# Patient Record
(demographics unavailable — no encounter records)

---

## 2024-11-12 NOTE — ASSESSMENT
[Verbal] : Verbal [Written] : Written [Demo] : Demo [Patient] : Patient [Home Health Provider] : Home Health Provider [Good - alert, interested, motivated] : Good - alert, interested, motivated [Verbalizes knowledge/Understanding] : Verbalizes knowledge/understanding [Dressing changes] : dressing changes [Skin Care] : skin care [Signs and symptoms of infection] : sign and symptoms of infection [How and When to Call] : how and when to call [Pain Management] : pain management [Patient responsibility to plan of care] : patient responsibility to plan of care [] : Yes [FreeTextEntry2] : Infection prevention Localized wound care  Goal remaining pain free regarding wounds [FreeTextEntry4] : Follow up in 2 weeks  [Stable] : stable [Home] : Home [Ambulatory] : Ambulatory [Faxed - Long Term Care/Home Health Agency] : Long Term Care/Home Health Agency: Faxed [FreeTextEntry1] : [facility]

## 2024-11-12 NOTE — PLAN
[FreeTextEntry1] : adaptic,  Ca alginate, tegaderm DD, to scalp 3 X/week F/u-2 wks  time spent-25 mins.

## 2024-11-12 NOTE — PHYSICAL EXAM
[Normal Thyroid] : the thyroid was normal [Normal Breath Sounds] : Normal breath sounds [Normal Heart Sounds] : normal heart sounds [Normal Rate and Rhythm] : normal rate and rhythm [Alert] : alert [Oriented to Person] : oriented to person [Oriented to Place] : oriented to place [Oriented to Time] : oriented to time [Calm] : calm [JVD] : no jugular venous distention  [Abdomen Masses] : No abdominal massess [Abdomen Tenderness] : ~T ~M No abdominal tenderness [Tender] : nontender [Enlarged] : not enlarged [de-identified] : adult WM, NAD, alert ,Ox3. [2 x 2] : 2 x 2  [FreeTextEntry1] : Right parietal scalp  [FreeTextEntry2] : 0.4 [FreeTextEntry3] : 1.0 [FreeTextEntry4] : 0.1 [de-identified] : Serous/sanguinous [de-identified] : calcium alginate  [de-identified] : Mechanically cleansed with sterile gauze and normal saline. Tegaderm  [TWNoteComboBox4] : Small [TWNoteComboBox6] : Other [de-identified] : Normal [de-identified] : None [de-identified] : None [de-identified] : 100% [de-identified] : No [de-identified] : 3x Weekly [de-identified] : Primary Dressing

## 2024-11-12 NOTE — REVIEW OF SYSTEMS
[Negative] : Heme/Lymph [FreeTextEntry2] : overweight,  [FreeTextEntry7] : Gastritis [FreeTextEntry9] : Hx liposarcoma shoulder , Bilateral CTS [de-identified] : multiple BCC scalp

## 2024-11-12 NOTE — HISTORY OF PRESENT ILLNESS
[FreeTextEntry1] : 67 yo WM, here for f/u of scalp wounds. Pt is s/p Mohs for BCC Right Central Frontal Scalp Posterior and BCC Right Inferior Frontal Scalp in 5/23. Compared to pt's cellphone photo from 5/23, marked improvement seen since then. Mod drainage with some yellow slough still seen. No SOI. 5/14/24 right parietal scalp wound smaller with new granulation and less slough. No SOI 5/21/24 right parietal scalp wound smaller with NO SOI. anterior scalp wound has been rebiopsied by dermatology by patient hX with no disease noted. DPD. Wound right side cultured and is significantly smaller 5/28/24 right parietal scalp wound almost closed and very clean. No SOI 6/4/24 right lateral scalp wound clean but bled through bandage last night. Small clean open wound. Very thin epithelium. No SOI   discussed culture report Serratia sensitive to gentamicin. but no purulent drainage noted. will observe  6/18/24 recently hospitalized for COPD. Small laceration to right thumb yesterday that frequently bleeds because he is on a thinner. The right lateral scalp would is larger and more drainage. No SOI. Will start topical Gentamicin because of prior culture and increased drainage 6/21/24 scalp wound larger with increased drainage. There are no signs of infection. Patient states he sleeps with head elevated and lays on his left side. Wound recultured. Will continue present treatment 6/25/24 Culture grew Serratia still sensative to gentamicin but no signs of infection. Still has serosanguinous drainage. Referred to Plastic surgery as area will not reepithelialize  7/2/24 less drainage right scalp wound and wound is smaller. No SOI 7/9/24 less drainage right parietal scalp and wound smaller. No SOI 7/30/24 right parietal scalp almost closed. No SOI. New problem left hand near index MCP small mass. no SOI 8/13/24 scalp wound almost closed. One small open spot. No SOI 8/27/24 right scalp wound has increased in size. Patient had 2 falls since last here but denies trauma. Was almost closed last visit. No SOI 9/24/24 defect right parietal scalp slightly smaller. No SOI. Patient was hospitalized since last visit and seen by Dermatology who discussed a possible re biopsy of area. No SOI 11/12/24 right parietal scalp defect almost closed. No SOI

## 2024-12-17 NOTE — HISTORY OF PRESENT ILLNESS
[FreeTextEntry1] : 65 yo WM, here for f/u of scalp wounds. Pt is s/p Mohs for BCC Right Central Frontal Scalp Posterior and BCC Right Inferior Frontal Scalp in 5/23. Compared to pt's cellphone photo from 5/23, marked improvement seen since then. Mod drainage with some yellow slough still seen. No SOI. 5/14/24 right parietal scalp wound smaller with new granulation and less slough. No SOI 5/21/24 right parietal scalp wound smaller with NO SOI. anterior scalp wound has been rebiopsied by dermatology by patient hX with no disease noted. DPD. Wound right side cultured and is significantly smaller 5/28/24 right parietal scalp wound almost closed and very clean. No SOI 6/4/24 right lateral scalp wound clean but bled through bandage last night. Small clean open wound. Very thin epithelium. No SOI   discussed culture report Serratia sensitive to gentamicin. but no purulent drainage noted. will observe  6/18/24 recently hospitalized for COPD. Small laceration to right thumb yesterday that frequently bleeds because he is on a thinner. The right lateral scalp would is larger and more drainage. No SOI. Will start topical Gentamicin because of prior culture and increased drainage 6/21/24 scalp wound larger with increased drainage. There are no signs of infection. Patient states he sleeps with head elevated and lays on his left side. Wound recultured. Will continue present treatment 6/25/24 Culture grew Serratia still sensative to gentamicin but no signs of infection. Still has serosanguinous drainage. Referred to Plastic surgery as area will not reepithelialize  7/2/24 less drainage right scalp wound and wound is smaller. No SOI 7/9/24 less drainage right parietal scalp and wound smaller. No SOI 7/30/24 right parietal scalp almost closed. No SOI. New problem left hand near index MCP small mass. no SOI 8/13/24 scalp wound almost closed. One small open spot. No SOI 8/27/24 right scalp wound has increased in size. Patient had 2 falls since last here but denies trauma. Was almost closed last visit. No SOI 9/24/24 defect right parietal scalp slightly smaller. No SOI. Patient was hospitalized since last visit and seen by Dermatology who discussed a possible re biopsy of area. No SOI 11/12/24 right parietal scalp defect almost closed. No SOI 12/17/24 right parietal scalp wound has 2 small open areas. Patient has not covered wound and area is proably opened from minor trauma. No SOI. Patient was recently hospitalized for respiratory problems

## 2024-12-17 NOTE — VITALS
Ortho    Looks  Good    A bit  More  drAINAGE  OUT OF THE  BLKE  TUBE  WILL LEAVE IN  TILL TOMORROW     D/C PLANNED  Sunday    LEAVE DRESSING  OVER  SKIN GRAFT  FOR  NOW    Long term  Ancef  Unless cx  change   [] : Yes [de-identified] : Patient has no pain related to wound.

## 2024-12-17 NOTE — PHYSICAL EXAM
[Normal Thyroid] : the thyroid was normal [Normal Breath Sounds] : Normal breath sounds [Normal Heart Sounds] : normal heart sounds [Normal Rate and Rhythm] : normal rate and rhythm [Alert] : alert [Oriented to Person] : oriented to person [Oriented to Place] : oriented to place [Oriented to Time] : oriented to time [Calm] : calm [JVD] : no jugular venous distention  [Abdomen Masses] : No abdominal massess [Abdomen Tenderness] : ~T ~M No abdominal tenderness [Tender] : nontender [Enlarged] : not enlarged [de-identified] : adult WM, NAD, alert ,Ox3. [2 x 2] : 2 x 2  [FreeTextEntry1] : Right parietal scalp - Superior  [FreeTextEntry2] : 0.7 [FreeTextEntry3] : 0.7 [FreeTextEntry4] : 0.1 [de-identified] : Scant Serous/sanguinous drainage noted to [de-identified] : Adaptic touch, Calcium alginate  [de-identified] : Mechanically cleansed with sterile gauze and normal saline. Cloth tape  [FreeTextEntry8] : 0.3 [FreeTextEntry7] : Right parietal scalp - Inferior  [FreeTextEntry9] : 0.3 [de-identified] : 0.1  [de-identified] : Scant Serous/sanguinous [de-identified] : Adaptic touch, Calcium alginate  [de-identified] : Mechanically cleansed with sterile gauze and normal saline. Cloth tape  [de-identified] : Normal [de-identified] : None [de-identified] : None [de-identified] : 100% [de-identified] : No [de-identified] : Every other day [de-identified] : Primary Dressing [de-identified] : Small [de-identified] : Normal [de-identified] : None [de-identified] : None [de-identified] : 100% [de-identified] : No [de-identified] : Every other day [de-identified] : Primary Dressing

## 2024-12-17 NOTE — REVIEW OF SYSTEMS
[Negative] : Heme/Lymph [FreeTextEntry2] : overweight,  [FreeTextEntry7] : Gastritis [FreeTextEntry9] : Hx liposarcoma shoulder , Bilateral CTS [de-identified] : multiple BCC scalp

## 2024-12-17 NOTE — ASSESSMENT
[Verbal] : Verbal [Written] : Written [Demo] : Demo [Patient] : Patient [Good - alert, interested, motivated] : Good - alert, interested, motivated [Verbalizes knowledge/Understanding] : Verbalizes knowledge/understanding [Dressing changes] : dressing changes [Skin Care] : skin care [Signs and symptoms of infection] : sign and symptoms of infection [How and When to Call] : how and when to call [Pain Management] : pain management [Patient responsibility to plan of care] : patient responsibility to plan of care [] : No [FreeTextEntry2] : Infection prevention Localized wound care  Goal remaining pain free regarding wounds  [FreeTextEntry3] : Pt now has 2 open wounds  [FreeTextEntry4] : Patient has a fall yesterday. No LOC or head trauma. MD made aware.  Small amount of supplies provided for patient. Pt will perform his own dressing changes.  Pt was admitted to the hospital the week of Thanksgiving for respiratory infection.  Follow up in 3 weeks  [Stable] : stable [Home] : Home [Ambulatory] : Ambulatory [Not Applicable - Long Term Care/Home Health Agency] : Long Term Care/Home Health Agency: Not Applicable

## 2025-04-28 NOTE — PLAN
[FreeTextEntry1] : adaptic,  DD, tegaderm , to scalp and other head and neck wounds3 X/week F/u-2 wks  time spent-35 mins.

## 2025-04-28 NOTE — VITALS
[Pain related to present condition?] : The patient's  pain is related to present condition. [Throbbing] : throbbing [Occasional] : occasional [] : Yes [de-identified] : 8/10 [FreeTextEntry3] : Right Lateral Skull [FreeTextEntry1] : "It goes away on it's own" [FreeTextEntry2] : "Don't know" [FreeTextEntry4] : Assessment [FreeTextEntry5] : Initial Visit - Medications reconciled.

## 2025-04-28 NOTE — PHYSICAL EXAM
[2 x 2] : 2 x 2  [Normal Thyroid] : the thyroid was normal [Normal Breath Sounds] : Normal breath sounds [Normal Heart Sounds] : normal heart sounds [Normal Rate and Rhythm] : normal rate and rhythm [Alert] : alert [Oriented to Person] : oriented to person [Oriented to Place] : oriented to place [Oriented to Time] : oriented to time [Calm] : calm [JVD] : no jugular venous distention  [Abdomen Masses] : No abdominal massess [Abdomen Tenderness] : ~T ~M No abdominal tenderness [Tender] : nontender [Enlarged] : not enlarged [de-identified] : adult WM, NAD, alert ,Ox3. [FreeTextEntry7] : Frontal Scalp s/p Mohs [FreeTextEntry8] : 0.7 [FreeTextEntry9] : 0.3 [de-identified] : 0.1 [de-identified] : 50% [de-identified] : 50% [de-identified] : Adaptic Touch [de-identified] : Mechanically cleansed with sterile gauze and 0.9% normal saline. Dry Dressing [de-identified] : Right Mandible [de-identified] : 1.0 [de-identified] : 0.6 [de-identified] : 0.1 [de-identified] : Small sanguinous [de-identified] : Adaptic Touch [de-identified] : Mechanically cleansed with sterile gauze and 0.9% normal saline. Dry Dressing [FreeTextEntry1] : Left Neck [FreeTextEntry2] : 0.4 [FreeTextEntry3] : 0.1 [FreeTextEntry4] : 0.1 [de-identified] : Adaptic Touch [de-identified] : Mechanically cleansed with sterile gauze and 0.9% normal saline. Dry Dressing [de-identified] : None [de-identified] : 100% [de-identified] : No [de-identified] : Every other day [de-identified] : Primary Dressing [de-identified] : None [de-identified] : No [de-identified] : No [de-identified] : Normal [de-identified] : None [de-identified] : None [de-identified] : Every other day [de-identified] : Primary Dressing [de-identified] : No [de-identified] : No [de-identified] : None [de-identified] : None [de-identified] : 100% [de-identified] : Every other day [de-identified] : Primary Dressing [TWNoteComboBox4] : None [TWNoteComboBox5] : No [de-identified] : No [de-identified] : Normal [de-identified] : None [de-identified] : None [de-identified] : 100% [de-identified] : Every other day [de-identified] : Primary Dressing

## 2025-04-28 NOTE — ASSESSMENT
[Verbal] : Verbal [Demo] : Demo [Patient] : Patient [Good - alert, interested, motivated] : Good - alert, interested, motivated [Verbalizes knowledge/Understanding] : Verbalizes knowledge/understanding [Dressing changes] : dressing changes [Skin Care] : skin care [Signs and symptoms of infection] : sign and symptoms of infection [Nutrition] : nutrition [How and When to Call] : how and when to call [Patient responsibility to plan of care] : patient responsibility to plan of care [] : Yes [Stable] : stable [Home] : Home [Ambulatory] : Ambulatory [Faxed - Long Term Care/Home Health Agency] : Long Term Care/Home Health Agency: Faxed [FreeTextEntry2] : Low Sodium Compliance Foot & Nail Care Glycemic Control Infection Prevention Maintain Optimal Skin Integrity to High Pressure Areas Nutrition & Wound Healing Offload & Pressure Relief PT will maintain BP within individually acceptable range. Promote & Restore Optimal Skin Integrity Protect & Guard Wound Site Wound Care (Dressing Changes) [FreeTextEntry3] : Initial Visit [FreeTextEntry4] : - MD assessed and advised to put adaptic touch and dry dressing on all 4 wounds. Agreed w/ patient who is following up with dermatologist. F/U 2 weeks [FreeTextEntry1] : [facility]

## 2025-04-28 NOTE — REVIEW OF SYSTEMS
[Negative] : Heme/Lymph [FreeTextEntry2] : overweight,  [FreeTextEntry7] : Gastritis [FreeTextEntry9] : Hx liposarcoma shoulder , Bilateral CTS [de-identified] : multiple BCC scalp

## 2025-04-28 NOTE — HISTORY OF PRESENT ILLNESS
[FreeTextEntry1] :  65 yo WM, here for f/u of scalp wounds. Pt is s/p Mohs for BCC Right Central Frontal Scalp Posterior and BCC Right Inferior Frontal Scalp in 5/23. Compared to pt's cellphone photo from 5/23, marked improvement seen since then. Mod drainage with some yellow slough still seen. No SOI. 5/14/24 right parietal scalp wound smaller with new granulation and less slough. No SOI 5/21/24 right parietal scalp wound smaller with NO SOI. anterior scalp wound has been rebiopsied by dermatology by patient hX with no disease noted. DPD. Wound right side cultured and is significantly smaller 5/28/24 right parietal scalp wound almost closed and very clean. No SOI 6/4/24 right lateral scalp wound clean but bled through bandage last night. Small clean open wound. Very thin epithelium. No SOI   discussed culture report Serratia sensitive to gentamicin. but no purulent drainage noted. will observe  6/18/24 recently hospitalized for COPD. Small laceration to right thumb yesterday that frequently bleeds because he is on a thinner. The right lateral scalp would is larger and more drainage. No SOI. Will start topical Gentamicin because of prior culture and increased drainage 6/21/24 scalp wound larger with increased drainage. There are no signs of infection. Patient states he sleeps with head elevated and lays on his left side. Wound recultured. Will continue present treatment 6/25/24 Culture grew Serratia still sensative to gentamicin but no signs of infection. Still has serosanguinous drainage. Referred to Plastic surgery as area will not reepithelialize  7/2/24 less drainage right scalp wound and wound is smaller. No SOI 7/9/24 less drainage right parietal scalp and wound smaller. No SOI 7/30/24 right parietal scalp almost closed. No SOI. New problem left hand near index MCP small mass. no SOI 8/13/24 scalp wound almost closed. One small open spot. No SOI 8/27/24 right scalp wound has increased in size. Patient had 2 falls since last here but denies trauma. Was almost closed last visit. No SOI 9/24/24 defect right parietal scalp slightly smaller. No SOI. Patient was hospitalized since last visit and seen by Dermatology who discussed a possible re biopsy of area. No SOI 11/12/24 right parietal scalp defect almost closed. No SOI 12/17/24 right parietal scalp wound has 2 small open areas. Patient has not covered wound and area is proably opened from minor trauma. No SOI. Patient was recently hospitalized for respiratory problems 4/28/25 66 Y/O white male who was regularly seen in [facility] till he was hospitalized for multiple other problems who now returns for continued care of the right parietal scalp. He has several other small open spots of the head and neck area that he will see a dermatogist for as they seem to have opened without etiology.Parietal wound appears almost closed with very small scab.

## 2025-05-20 NOTE — PHYSICAL EXAM
[Well Developed] : well developed [No Acute Distress] : no acute distress [Normal Venous Pressure] : normal venous pressure [Normal S1, S2] : normal S1, S2 [No Murmur] : no murmur [Clear Lung Fields] : clear lung fields [No Respiratory Distress] : no respiratory distress  [Soft] : abdomen soft [Moves all extremities] : moves all extremities [Alert and Oriented] : alert and oriented [de-identified] : Did not assess gait [de-identified] : Trace B/L LE edema

## 2025-05-20 NOTE — CARDIOLOGY SUMMARY
[de-identified] : EKG 5/20/25: SR 85 bpm, PRWP, non specific IVCD [de-identified] : TTE 25 ( 2.5): LVEF 27%; grade 3 diastolic dysfunction, severely enlarged RV with reduced RV function, LA severely dilated, mild intravalvular MR, no LV, LVIDd 5.8cm. [de-identified] : [facility]/MEMS 4/18/2025: RAP 3, PA 32/9/18, PVR 1.42, PCWP 10. /62/83. PA sat 62.6%, RAsat 98%. Jose CO/CI 5.64/2.49, TD CO/CI 5.28/2.33. MEMS PAD 21.  [facility] 9/16/24: LM 10% stenosis, pLAD 45 ISR, mLAD 40% ISR, pLCx 35% stenosis [facility] 8/7/18: LM minor irregularities. pLAD patent stent. mLAD patent stents. dLAD 40% ISR. LCx minor irregularities. OM1 patent stent. dRCA tubular 80% stenosis.  [facility] 4/16/18: LM minor irregularities. pLAD patent stent. D1 small vessel with severe disease. D2 small vessel with severe disease. dLAD patent stent. LCx minor irregularities. OM1 patent stent. dRCA tubular 70% stenosis.  [facility] 4/2/18: LM minor irregularities. pLAD patent stent. LCx minor irregularities. OM1 patent stent. RCA mild disease.

## 2025-05-20 NOTE — REVIEW OF SYSTEMS
[TextEntry] : The remaining 14-point ROS is otherwise negative or non-contributory except as noted in the HPI.

## 2025-05-20 NOTE — HISTORY OF PRESENT ILLNESS
[FreeTextEntry1] : 67M with chronic ICM HFrEF (LVEF 27, LVIDd 5.8, 4/2025) previously on home dobutamine 2.5 mcg/kg/hr (~3/2025-4/11/2025) s/p SQ-ICD, PMHx CAD s/p multiple PCIs, CABGx3 (2023), chronic angina (ranolazine), T2DM c/b neuropathy, prior LV apical thrombus, VTE (Eliquis), pAF, severe MR s/p M-MEY (2024), prostate CA, basal cell carcinoma (Vismodegib), COPD, CKD & brachial plexus injury with recurrent admissions for ADHF & CP who presents today for hospital follow-up. Of note, he has been followed with outpatient providers at both [facility] & [facility]. States he was recently admitted to [facility] "several weeks ago" where he was reported to not be an AT candidate & was discharged on palliative inotrope (dobutamine).  RHC 4/18/2025 revealed normal filling pressures & adequate cardiac output. CardioMEMS was placed (PAD 21) and he was dc'd home on mod-high dose GDMT (Entresto , Toprol 12.5, Aldactone 25, Farxiga 10, Bumex 1mg).  He was readmitted to [facility] 4/30-5/14 for hypotension and was transiently on levophed. GDMT was held and slowly reintroduced. He was recommended for TAMELA however pt declined and was sent home on Toprol-XL 25, Losartan 25, Spironolactone 25 and Bumex 1mg.   Since discharge he reports that he presented back to the ED two days ago on Sunday for persistent chest pain and palpitations that prevented him from sleeping. He also endorsed dyspnea. His MEMS PAD was 18, BP 98/65 HR 76. ProBNP ~3K from 15K so much improved. CXR showed no acute pulmonary process. No changes were made to his medical therapy. He was seen by general cardiology and was not admitted. He does not check his BP regularly but when the visiting nurse comes it has been mostly 90s systolic. He states his weight increased while he was hospitalized but is now back to his baseline of ~259 lbs. He endorses mild LE edema today. He reports he is urinating well and responding well to Bumex.

## 2025-05-20 NOTE — ASSESSMENT
[FreeTextEntry1] : ACC/AHA Stage C-D Chronic systolic congestive heart failure -Ischemic cardiomyopathy,  LVEF 27%, LVIDd 5.8cm -NYHA class III -proBNP >3K 5/18 from >15K 5/14 (previously 5995, 4559 on 4/30, 2408 on 4/10) -MEMS PAD elevated to 29 today (22 yesterday?) -Clinically appears close to euvolemia on exam w/ lukewarm lower extremities. BP soft. -Diuretics: Continue Bumex 1 mg PO daily with additional PRN based on MEMS. -GDMT: Needs aggressive optimization but limited by BP which fluctuates. The patient was educated about the indications and benefit of medical therapy. Decrease Losartan to 12.5 mg daily in view of BP. C/w Toprol-XL 25 mg nightly, Spironolactone 25mg daily and Farxiga 10 mg daily. Recommended he take MRA and SGLT2i in the afternoon to space out medications.  -Device: SQ ICD -Recommended cardiac rehab however pt declines at this time. -Educated on the importance of daily standing weights.  -Asked to keep home BP/weight log for our review. -Patient has some concerning high risk features including multiple hospital admission, hypotension w/ inability to tolerate GDMT. Repeat RHC showed elevated left sided filling pressure and preserved CO/CI. -Previously informed not a candidate for transplant given multiple skin malignancies and risk of worsening malignancy with immunosuppression.  Stage 3 chronic kidney disease -?Baseline sCr ~1.6 -Avoid nephrotoxic agents -Continue close monitoring  CAD (coronary artery disease).  -H/o CABG ('23) and multiple PCIs -Plavix (No DAPT to avoid triple therapy) -BB as above -High intensity statin -Ranexa 500mg BID  Paroxysmal atrial fibrillation -Currently in SR  -AC: Eliquis 5 mg BID.

## 2025-06-04 NOTE — ASSESSMENT
[FreeTextEntry1] : 66 y/o M with a PMH of ICM/HFrEF (LVEF <20%, LVIDd 6.1 cm) previously on home dobutamine 2.5 mcg/kg/hr (~3/2025-4/11/2025) s/p SQ-ICD, CAD s/p multiple PCIs, CABGx3 (2023), chronic angina (ranolazine), T2DM c/b neuropathy, prior LV apical thrombus, VTE (Eliquis), pAF, severe MR s/p M-MEY (2024), prostate CA, basal cell carcinoma (Vismodegib), COPD, CKD3, brachial plexus injury, and subclavian vein stenosis (h/o right SVC stent?), with recurrent admissions for ADHF & CP who presents today for routine follow-up.  # ACC/AHA Stage C-D Chronic systolic congestive heart failure -Ischemic cardiomyopathy, LVEF < 20%, LVIDd 6.1 cm -NYHA class III -proBNP >3K 5/18 from >15K 5/14 (previously 5995, 4559 on 4/30, 2408 on 4/10) -His last MEMS PAD was 20-21 on 5/30 while he was admitted at the hospital. Encouraged him to continue send readings daily. -Clinically appears hypervolemic on exam w/ lukewarm lower extremities. BP borderline but asymptmatic. -Diuretics: Continue Bumex 2 mg PO BID for now (previously on 1 mg daily but dose was increased during his recent hospitalization at [place]) with additional PRN based on MEMS. -GDMT: Optimization limited by BP. C/w Toprol-XL 12.5 mg nightly, Spironolactone 12.5 mg BID, and Jardiance 10 mg daily.  -Device: s/p SQ ICD. -Educated on the importance of daily standing weights.  -Asked to keep home BP/weight log for our review. -Patient has some concerning high risk features including multiple hospital admission, hypotension w/ inability to tolerate GDMT. Repeat RHC showed elevated left sided filling pressure and preserved CO/CI. Will arrange for him to have a CPET. -Previously informed not a candidate for transplant given multiple skin malignancies and risk of worsening malignancy with immunosuppression.  # Stage 3 chronic kidney disease -?Baseline sCr ~1.6 -Avoid nephrotoxic agents -He will repeat labs prior to his f/u visit next week.  # CAD -H/o CABG ('23) and multiple PCIs -Plavix (No DAPT to avoid triple therapy) -BB as above -High intensity statin -Ranexa 500mg BID  # Paroxysmal atrial fibrillation -Currently in SR  -AC: Eliquis 5 mg BID.  RTO in 1 week.

## 2025-06-04 NOTE — END OF VISIT
[FreeTextEntry3] : IKelsey MD independently performed a history and physical examination of the patient and formulated the management plan. I have reviewed the note by NIKI Li and agree with the documented findings and plan of care.  [Time Spent: ___ minutes] : I have spent [unfilled] minutes of time on the encounter which excludes teaching and separately reported services.

## 2025-06-04 NOTE — PHYSICAL EXAM
[Well Developed] : well developed [No Acute Distress] : no acute distress [Normal S1, S2] : normal S1, S2 [No Murmur] : no murmur [Clear Lung Fields] : clear lung fields [No Respiratory Distress] : no respiratory distress  [Soft] : abdomen soft [Moves all extremities] : moves all extremities [Alert and Oriented] : alert and oriented [de-identified] : CESAR elevated [de-identified] : 1+ BLE edema [de-identified] : Did not assess gait

## 2025-06-04 NOTE — HISTORY OF PRESENT ILLNESS
[FreeTextEntry1] : 66 y/o M with a PMH of ICM/HFrEF (LVEF <20%, LVIDd 6.1 cm) previously on home dobutamine 2.5 mcg/kg/hr (~3/2025-4/11/2025) s/p SQ-ICD, CAD s/p multiple PCIs, CABGx3 (2023), chronic angina (ranolazine), T2DM c/b neuropathy, prior LV apical thrombus, VTE (Eliquis), pAF, severe MR s/p M-MEY (2024), prostate CA, basal cell carcinoma (Vismodegib), COPD, CKD3, brachial plexus injury, and subclavian vein stenosis (h/o right SVC stent?), with recurrent admissions for ADHF & CP who presents today for routine follow-up.  Of note, he has been followed with outpatient providers at both [facility] & [facility]. States he was recently admitted to [facility] "several weeks ago" where he was reported to not be an AT candidate & was discharged on palliative inotrope (dobutamine).   He presented to [facility] on 4/4/25 for ADHF. His home dobutamine 2.5 mcg/kg/min was dc on 4/11. He is s/p RHC on 4/18 which revealed normal filling pressures & adequate cardiac output. CardioMEMS was placed (PAD 21) and he was dc'd home on mod-high dose GDMT (Entresto , Toprol 12.5, Aldactone 25, Farxiga 10, Bumex 1mg). Discharged home on 4/21.  He was readmitted to [facility] 4/30-5/14 for hypotension and was transiently on levophed. He is s/p RHC which showed elevated left sided filling pressures and normal cardiac output so no need for inotropes. Of note, he was evaluated by vascular given c/f SVC occlusion during his cath. He is s/p dx venogram, found with stenotic R subclavian vein and inability to cross lesion with wire so intervention was performed. His GDMT was held and slowly reintroduced. He was recommended for TAMELA however pt declined and was sent home on Toprol-XL 25, Losartan 25, Spironolactone 25 and Bumex 1mg.   On 5/18 he presented back to the ED for persistent chest pain and palpitations that prevented him from sleeping. He also endorsed dyspnea. His MEMS PAD was 18, BP 98/65 HR 76. ProBNP ~3K from 15K so much improved. CXR showed no acute pulmonary process. No changes were made to his medical therapy. He was seen by general cardiology and was not admitted.  He was recently admitted to [facility] with worsening SOB (records not available). His PAD on his CardioMEMS during his admission ranged 18-21. Discharged home last night. He states his LE has improved after his hospitalization, but his SOB is unchanged. He endorses SOB walking from the parking lot today. He denies CP, palpitations, syncope, LH/dizziness. His ICD has not discharged.

## 2025-06-04 NOTE — CARDIOLOGY SUMMARY
[de-identified] : EKG 5/20/25: SR 85 bpm, PRWP, non specific IVCD [de-identified] : TTE 25: LVEF <20%, LVIDd 6.1 cm, grade 1 DD, RV not well visualized but appears mod enlarged with reduced RVSF, mod AS, mod MR.  TTE 25 ( 2.5): LVEF 27%; grade 3 diastolic dysfunction, severely enlarged RV with reduced RV function, LA severely dilated, mild intravalvular MR, no LV, LVIDd 5.8cm. [de-identified] : [facility] 5/2/25: RA 4, RV 42/7, PA 45/18/32, PCW 22 (v37), PA sat 69.8%, Jose CO/CI 6.31/2.62, PVR 1.56 SCHWAB.  [facility]: RAP 3, PA 32/9/18, PVR 1.42, PCWP 10. /62/83. PA sat 62.6%, RAsat 98%. Jose CO/CI 5.64/2.49, TD CO/CI 5.28/2.33. MEMS PAD 21.  [facility] 9/16/24: LM 10% stenosis, pLAD 45 ISR, mLAD 40% ISR, pLCx 35% stenosis [facility] 8/7/18: LM minor irregularities. pLAD patent stent. mLAD patent stents. dLAD 40% ISR. LCx minor irregularities. OM1 patent stent. dRCA tubular 80% stenosis.  [facility] 4/16/18: LM minor irregularities. pLAD patent stent. D1 small vessel with severe disease. D2 small vessel with severe disease. dLAD patent stent. LCx minor irregularities. OM1 patent stent. dRCA tubular 70% stenosis.  [facility] 4/2/18: LM minor irregularities. pLAD patent stent. LCx minor irregularities. OM1 patent stent. RCA mild disease.

## 2025-06-19 NOTE — ASSESSMENT
[FreeTextEntry1] : 66 y/o M with a PMH of ICM/HFrEF (LVEF <20%, LVIDd 6.1 cm) previously on home dobutamine 2.5 mcg/kg/hr (~3/2025-4/11/2025) s/p S-ICD, CAD s/p multiple PCIs, CABGx3 (2023), chronic angina (ranolazine), T2DM c/b neuropathy, prior LV apical thrombus, VTE (Eliquis), pAF, severe MR s/p M-MEY (2024), prostate CA, basal cell carcinoma (Vismodegib), COPD, CKD3, brachial plexus injury, and subclavian vein stenosis (h/o right SVC stent?), with recurrent admissions for SOB & CP who presents today for follow-up after recent hospitalization.  # ACC/AHA Stage C-D Chronic systolic congestive heart failure -Ischemic cardiomyopathy, LVEF < 20%, LVIDd 6.1 cm -NYHA class III -Clinically appears mildly hypovolemic on exam w/ lukewarm lower extremities. -Diuretics: Asked that he increase his fluid intake today and decrease Bumex to 2 mg daily from BID. -His PAD on his CardioMEMS today is 22. Current goal is 19. Will change it to < 25. -GDMT: Optimization limited by BP. C/w Toprol-XL 12.5 mg nightly, Spironolactone 12.5 mg BID, and Jardiance 10 mg daily.  -Device: s/p S-ICD. He reports not following with EP and would like to establish care in [place]. Will refer to our EP team. -Educated on the importance of daily standing weights.  -Asked to keep home BP/weight log for our review. -Patient has some concerning high risk features including multiple hospital admission, hypotension w/ inability to tolerate GDMT. Repeat RHC showed elevated left sided filling pressure and preserved CO/CI. He is scheduled for a CPET on 6/27. -Previously informed not a candidate for transplant given multiple skin malignancies and risk of worsening malignancy with immunosuppression. -Will request recent hospital records from [employer].  # Stage 3 chronic kidney disease -Baseline sCr appears to be ~1.5-1.6 -Recent labs from 6/14/25 showed his BUN/Cr were up to 42/2.10. -Will decrease his diuretics as above and he will repeat labs prior to his f/u appt next week. -Avoid nephrotoxic agents  # CAD -H/o CABG ('23) and multiple PCIs -Last angiogram was 9/2024? Will d/w Dr. Cassidyvel his recurrent CP symptoms. -Plavix (No DAPT to avoid triple therapy) -C/w BB as above -High intensity statin -Ranexa 500mg BID  # Paroxysmal atrial fibrillation -Currently in SR  -AC: Eliquis 5 mg BID.  # Unintentional weight loss -Asked that he f/u with his PCP given his h/o cancer.  RTO in 1 week.

## 2025-06-19 NOTE — CARDIOLOGY SUMMARY
[de-identified] : 6/17/25: SR 89 bpm, frequent PVCs, IVCD, unchanged from prior EKG from 5/20/25. 5/20/25: SR 85 bpm, PRWP, non specific IVCD [de-identified] : TTE 25: LVEF <20%, LVIDd 6.1 cm, grade 1 DD, RV not well visualized but appears mod enlarged with reduced RVSF, mod AS, mod MR.  TTE 25 ( 2.5): LVEF 27%; grade 3 diastolic dysfunction, severely enlarged RV with reduced RV function, LA severely dilated, mild intravalvular MR, no LV, LVIDd 5.8cm. [de-identified] : [facility] 5/2/25: RA 4, RV 42/7, PA 45/18/32, PCW 22 (v37), PA sat 69.8%, Jose CO/CI 6.31/2.62, PVR 1.56 SCHWAB.  [facility]: RAP 3, PA 32/9/18, PVR 1.42, PCWP 10. /62/83. PA sat 62.6%, RAsat 98%. Jose CO/CI 5.64/2.49, TD CO/CI 5.28/2.33. MEMS PAD 21.  [facility] 9/16/24: LM 10% stenosis, pLAD 45 ISR, mLAD 40% ISR, pLCx 35% stenosis [facility] 8/7/18: LM minor irregularities. pLAD patent stent. mLAD patent stents. dLAD 40% ISR. LCx minor irregularities. OM1 patent stent. dRCA tubular 80% stenosis.  [facility] 4/16/18: LM minor irregularities. pLAD patent stent. D1 small vessel with severe disease. D2 small vessel with severe disease. dLAD patent stent. LCx minor irregularities. OM1 patent stent. dRCA tubular 70% stenosis.  [facility] 4/2/18: LM minor irregularities. pLAD patent stent. LCx minor irregularities. OM1 patent stent. RCA mild disease.

## 2025-06-19 NOTE — ASSESSMENT
[FreeTextEntry1] : 66 y/o M with a PMH of ICM/HFrEF (LVEF <20%, LVIDd 6.1 cm) previously on home dobutamine 2.5 mcg/kg/hr (~3/2025-4/11/2025) s/p S-ICD, CAD s/p multiple PCIs, CABGx3 (2023), chronic angina (ranolazine), T2DM c/b neuropathy, prior LV apical thrombus, VTE (Eliquis), pAF, severe MR s/p M-MEY (2024), prostate CA, basal cell carcinoma (Vismodegib), COPD, CKD3, brachial plexus injury, and subclavian vein stenosis (h/o right SVC stent?), with recurrent admissions for SOB & CP who presents today for follow-up after recent hospitalization.  # ACC/AHA Stage C-D Chronic systolic congestive heart failure -Ischemic cardiomyopathy, LVEF < 20%, LVIDd 6.1 cm -NYHA class III -Clinically appears mildly hypovolemic on exam w/ lukewarm lower extremities. -Diuretics: Asked that he increase his fluid intake today and decrease Bumex to 2 mg daily from BID. -His PAD on his CardioMEMS today is 22. Current goal is 19. Will change it to < 25. -GDMT: Optimization limited by BP. C/w Toprol-XL 12.5 mg nightly, Spironolactone 12.5 mg BID, and Jardiance 10 mg daily.  -Device: s/p S-ICD. He reports not following with EP and would like to establish care in [place]. Will refer to our EP team. -Educated on the importance of daily standing weights.  -Asked to keep home BP/weight log for our review. -Patient has some concerning high risk features including multiple hospital admission, hypotension w/ inability to tolerate GDMT. Repeat RHC showed elevated left sided filling pressure and preserved CO/CI. He is scheduled for a CPET on 6/27. -Previously informed not a candidate for transplant given multiple skin malignancies and risk of worsening malignancy with immunosuppression. -Will request recent hospital records from [employer].  # Stage 3 chronic kidney disease -Baseline sCr appears to be ~1.5-1.6 -Recent labs from 6/14/25 showed his BUN/Cr were up to 42/2.10. -Will decrease his diuretics as above and he will repeat labs prior to his f/u appt next week. -Avoid nephrotoxic agents  # CAD -H/o CABG ('23) and multiple PCIs -Last angiogram was 9/2024? Will d/w Dr. Cassidyvel his recurrent CP symptoms. -Plavix (No DAPT to avoid triple therapy) -C/w BB as above -High intensity statin -Ranexa 500mg BID  # Paroxysmal atrial fibrillation -Currently in SR  -AC: Eliquis 5 mg BID.  # Unintentional weight loss -Asked that he f/u with his PCP given his h/o cancer.  RTO in 1 week.

## 2025-06-19 NOTE — HISTORY OF PRESENT ILLNESS
[FreeTextEntry1] : 66 y/o M with a PMH of ICM/HFrEF (LVEF <20%, LVIDd 6.1 cm) previously on home dobutamine 2.5 mcg/kg/hr (~3/2025-4/11/2025) s/p S-ICD, CAD s/p multiple PCIs, CABGx3 (2023), chronic angina (ranolazine), T2DM c/b neuropathy, prior LV apical thrombus, VTE (Eliquis), pAF, severe MR s/p M-MEY (2024), prostate CA, basal cell carcinoma (Vismodegib), COPD, CKD3, brachial plexus injury, and subclavian vein stenosis (h/o right SVC stent?), with recurrent admissions for SOB & CP who presents today for follow-up after recent hospitalization.  Of note, he has been followed with outpatient providers at both [facility] & [facility]. States he was recently admitted to [facility] "several weeks ago" where he was reported to not be an AT candidate & was discharged on palliative inotrope (dobutamine).   He presented to [facility] on 4/4/25 for ADHF. His home dobutamine 2.5 mcg/kg/min was dc on 4/11. He is s/p RHC on 4/18 which revealed normal filling pressures & adequate cardiac output. CardioMEMS was placed (PAD 21) and he was dc'd home on mod-high dose GDMT (Entresto , Toprol 12.5, Aldactone 25, Farxiga 10, Bumex 1mg). Discharged home on 4/21.  He was readmitted to [facility] 4/30-5/14 for hypotension and was transiently on levophed. He is s/p RHC which showed elevated left sided filling pressures and normal cardiac output so no need for inotropes. Of note, he was evaluated by vascular given c/f SVC occlusion during his cath. He is s/p dx venogram, found with stenotic R subclavian vein and inability to cross lesion with wire so intervention was performed. His GDMT was held and slowly reintroduced. He was recommended for TAMELA however pt declined and was sent home on Toprol-XL 25, Losartan 25, Spironolactone 25 and Bumex 1mg.   On 5/18 he presented back to the ED for persistent chest pain and palpitations that prevented him from sleeping. He also endorsed dyspnea. His MEMS PAD was 18, BP 98/65 HR 76. ProBNP ~3K from 15K so much improved. CXR showed no acute pulmonary process. No changes were made to his medical therapy. He was seen by general cardiology and was not admitted.  He was then admitted to [facility] the end of May with worsening SOB (records not available). His PAD on his CardioMEMS during his admission ranged 18-21. Readmitted there again from 6/8-6/10 for SOB and CP.   Today he reports ongoing symptoms of chest pressure and dyspnea with minimal exertion. He states his BP has been low and endorses LH/dizziness but denies any syncope or falls. Over the past month he has lost ~25 lbs. He states his appetite is normal but endorses feeling nauseas a lot. He denies orthopnea, PND, and LE edema. His ICD has not discharged.

## 2025-06-19 NOTE — CARDIOLOGY SUMMARY
[de-identified] : 6/17/25: SR 89 bpm, frequent PVCs, IVCD, unchanged from prior EKG from 5/20/25. 5/20/25: SR 85 bpm, PRWP, non specific IVCD [de-identified] : TTE 25: LVEF <20%, LVIDd 6.1 cm, grade 1 DD, RV not well visualized but appears mod enlarged with reduced RVSF, mod AS, mod MR.  TTE 25 ( 2.5): LVEF 27%; grade 3 diastolic dysfunction, severely enlarged RV with reduced RV function, LA severely dilated, mild intravalvular MR, no LV, LVIDd 5.8cm. [de-identified] : [facility] 5/2/25: RA 4, RV 42/7, PA 45/18/32, PCW 22 (v37), PA sat 69.8%, Jose CO/CI 6.31/2.62, PVR 1.56 SCHWAB.  [facility]: RAP 3, PA 32/9/18, PVR 1.42, PCWP 10. /62/83. PA sat 62.6%, RAsat 98%. Jose CO/CI 5.64/2.49, TD CO/CI 5.28/2.33. MEMS PAD 21.  [facility] 9/16/24: LM 10% stenosis, pLAD 45 ISR, mLAD 40% ISR, pLCx 35% stenosis [facility] 8/7/18: LM minor irregularities. pLAD patent stent. mLAD patent stents. dLAD 40% ISR. LCx minor irregularities. OM1 patent stent. dRCA tubular 80% stenosis.  [facility] 4/16/18: LM minor irregularities. pLAD patent stent. D1 small vessel with severe disease. D2 small vessel with severe disease. dLAD patent stent. LCx minor irregularities. OM1 patent stent. dRCA tubular 70% stenosis.  [facility] 4/2/18: LM minor irregularities. pLAD patent stent. LCx minor irregularities. OM1 patent stent. RCA mild disease.
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
